# Patient Record
Sex: FEMALE
[De-identification: names, ages, dates, MRNs, and addresses within clinical notes are randomized per-mention and may not be internally consistent; named-entity substitution may affect disease eponyms.]

---

## 2018-03-13 ENCOUNTER — HOSPITAL ENCOUNTER (EMERGENCY)
Dept: HOSPITAL 12 - ER | Age: 63
Discharge: HOME | End: 2018-03-13
Payer: COMMERCIAL

## 2018-03-13 VITALS — WEIGHT: 150 LBS | HEIGHT: 64 IN | BODY MASS INDEX: 25.61 KG/M2
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
## 2018-03-13 DIAGNOSIS — Z88.0: ICD-10-CM
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
## 2018-03-13 DIAGNOSIS — L50.9: ICD-10-CM

## 2018-03-13 DIAGNOSIS — R20.0: ICD-10-CM
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
## 2018-03-13 DIAGNOSIS — I10: ICD-10-CM

## 2018-03-13 DIAGNOSIS — S13.4XXA: Primary | ICD-10-CM

## 2018-03-13 DIAGNOSIS — M54.9: ICD-10-CM

## 2018-03-13 PROCEDURE — A4663 DIALYSIS BLOOD PRESSURE CUFF: HCPCS

## 2018-03-13 NOTE — NUR
IV removed.  Catheter intact and site benign. Pressure and 4x4 gauze applied to 
site. No bleeding noted.  Patient discharged to home in stable conditon.  
Written and verbal after care instructions given to patient and patient's adult 
daughter. Patient and family verbalized understanding of instructions. Patient 
left ER with steady gait.